# Patient Record
Sex: FEMALE | URBAN - METROPOLITAN AREA
[De-identification: names, ages, dates, MRNs, and addresses within clinical notes are randomized per-mention and may not be internally consistent; named-entity substitution may affect disease eponyms.]

---

## 2021-04-30 ENCOUNTER — APPOINTMENT (RX ONLY)
Dept: URBAN - METROPOLITAN AREA CLINIC 55 | Facility: CLINIC | Age: 41
Setting detail: DERMATOLOGY
End: 2021-04-30

## 2021-04-30 DIAGNOSIS — L81.4 OTHER MELANIN HYPERPIGMENTATION: ICD-10-CM

## 2021-04-30 PROCEDURE — ? COSMETIC CONSULTATION: BROWN SPOTS

## 2021-04-30 PROCEDURE — ? COUNSELING

## 2021-04-30 NOTE — PROCEDURE: COUNSELING
Detail Level: Detailed
Patient Specific Counseling (Will Not Stick From Patient To Patient): Patient educated recommended electro cautery vs pick sure. More than 1 treatment need no guarantee that treatment will resolve lesions.